# Patient Record
Sex: FEMALE | Race: WHITE | Employment: UNEMPLOYED | URBAN - METROPOLITAN AREA
[De-identification: names, ages, dates, MRNs, and addresses within clinical notes are randomized per-mention and may not be internally consistent; named-entity substitution may affect disease eponyms.]

---

## 2021-10-08 ENCOUNTER — HOSPITAL ENCOUNTER (OUTPATIENT)
Age: 26
Discharge: HOME | End: 2021-10-09
Attending: OBSTETRICS & GYNECOLOGY | Admitting: OBSTETRICS & GYNECOLOGY
Payer: MEDICAID

## 2021-10-08 ENCOUNTER — APPOINTMENT (OUTPATIENT)
Dept: ULTRASOUND IMAGING | Age: 26
End: 2021-10-08
Payer: MEDICAID

## 2021-10-08 VITALS
HEIGHT: 68 IN | DIASTOLIC BLOOD PRESSURE: 84 MMHG | TEMPERATURE: 98 F | WEIGHT: 204 LBS | HEART RATE: 94 BPM | SYSTOLIC BLOOD PRESSURE: 136 MMHG | RESPIRATION RATE: 20 BRPM | BODY MASS INDEX: 30.92 KG/M2

## 2021-10-08 PROBLEM — O47.9 THREATENED LABOR: Status: ACTIVE | Noted: 2021-10-08

## 2021-10-08 PROCEDURE — U0003 INFECTIOUS AGENT DETECTION BY NUCLEIC ACID (DNA OR RNA); SEVERE ACUTE RESPIRATORY SYNDROME CORONAVIRUS 2 (SARS-COV-2) (CORONAVIRUS DISEASE [COVID-19]), AMPLIFIED PROBE TECHNIQUE, MAKING USE OF HIGH THROUGHPUT TECHNOLOGIES AS DESCRIBED BY CMS-2020-01-R: HCPCS

## 2021-10-08 PROCEDURE — 87635 SARS-COV-2 COVID-19 AMP PRB: CPT

## 2021-10-08 PROCEDURE — 80307 DRUG TEST PRSMV CHEM ANLYZR: CPT

## 2021-10-08 PROCEDURE — 87081 CULTURE SCREEN ONLY: CPT

## 2021-10-08 PROCEDURE — 86850 RBC ANTIBODY SCREEN: CPT

## 2021-10-08 PROCEDURE — 86900 BLOOD TYPING SEROLOGIC ABO: CPT

## 2021-10-08 PROCEDURE — 87340 HEPATITIS B SURFACE AG IA: CPT

## 2021-10-08 PROCEDURE — 80048 BASIC METABOLIC PNL TOTAL CA: CPT

## 2021-10-08 PROCEDURE — 87077 CULTURE AEROBIC IDENTIFY: CPT

## 2021-10-08 PROCEDURE — 86701 HIV-1ANTIBODY: CPT

## 2021-10-08 PROCEDURE — 84443 ASSAY THYROID STIM HORMONE: CPT

## 2021-10-08 PROCEDURE — 81003 URINALYSIS AUTO W/O SCOPE: CPT

## 2021-10-08 PROCEDURE — 86803 HEPATITIS C AB TEST: CPT

## 2021-10-08 PROCEDURE — 86762 RUBELLA ANTIBODY: CPT

## 2021-10-08 PROCEDURE — 86780 TREPONEMA PALLIDUM: CPT

## 2021-10-08 PROCEDURE — 76815 OB US LIMITED FETUS(S): CPT

## 2021-10-08 PROCEDURE — 85025 COMPLETE CBC W/AUTO DIFF WBC: CPT

## 2021-10-08 PROCEDURE — 86901 BLOOD TYPING SEROLOGIC RH(D): CPT

## 2021-10-08 PROCEDURE — 83036 HEMOGLOBIN GLYCOSYLATED A1C: CPT

## 2021-10-08 PROCEDURE — U0005 INFEC AGEN DETEC AMPLI PROBE: HCPCS

## 2021-10-08 ASSESSMENT — PAIN DESCRIPTION - DESCRIPTORS: DESCRIPTORS: CRAMPING

## 2021-10-09 LAB
ABO/RH: NORMAL
AMPHETAMINE SCREEN, URINE: ABNORMAL
ANION GAP SERPL CALCULATED.3IONS-SCNC: 12 MMOL/L (ref 3–16)
ANTIBODY SCREEN: NORMAL
BACTERIA: ABNORMAL /HPF
BARBITURATE SCREEN URINE: ABNORMAL
BASOPHILS ABSOLUTE: 0 K/UL (ref 0–0.2)
BASOPHILS RELATIVE PERCENT: 0.2 %
BENZODIAZEPINE SCREEN, URINE: ABNORMAL
BILIRUBIN URINE: NEGATIVE
BLOOD, URINE: NEGATIVE
BUN BLDV-MCNC: 4 MG/DL (ref 7–20)
CALCIUM SERPL-MCNC: 8.5 MG/DL (ref 8.3–10.6)
CANNABINOID SCREEN URINE: POSITIVE
CHLORIDE BLD-SCNC: 102 MMOL/L (ref 99–110)
CLARITY: ABNORMAL
CO2: 22 MMOL/L (ref 21–32)
COCAINE METABOLITE SCREEN URINE: ABNORMAL
COLOR: YELLOW
CREAT SERPL-MCNC: <0.5 MG/DL (ref 0.6–1.1)
EOSINOPHILS ABSOLUTE: 0 K/UL (ref 0–0.6)
EOSINOPHILS RELATIVE PERCENT: 0.5 %
EPITHELIAL CELLS, UA: 17 /HPF (ref 0–5)
ESTIMATED AVERAGE GLUCOSE: 82.5 MG/DL
GFR AFRICAN AMERICAN: >60
GFR NON-AFRICAN AMERICAN: >60
GLUCOSE BLD-MCNC: 89 MG/DL (ref 70–99)
GLUCOSE URINE: NEGATIVE MG/DL
HBA1C MFR BLD: 4.5 %
HCT VFR BLD CALC: 31.7 % (ref 36–48)
HEMOGLOBIN: 10.6 G/DL (ref 12–16)
HEPATITIS B SURFACE ANTIGEN INTERPRETATION: NORMAL
HEPATITIS C ANTIBODY INTERPRETATION: NORMAL
HYALINE CASTS: 4 /LPF (ref 0–8)
KETONES, URINE: NEGATIVE MG/DL
LEUKOCYTE ESTERASE, URINE: ABNORMAL
LYMPHOCYTES ABSOLUTE: 2.5 K/UL (ref 1–5.1)
LYMPHOCYTES RELATIVE PERCENT: 26.4 %
Lab: ABNORMAL
MCH RBC QN AUTO: 28.2 PG (ref 26–34)
MCHC RBC AUTO-ENTMCNC: 33.4 G/DL (ref 31–36)
MCV RBC AUTO: 84.4 FL (ref 80–100)
METHADONE SCREEN, URINE: ABNORMAL
MICROSCOPIC EXAMINATION: YES
MONOCYTES ABSOLUTE: 0.7 K/UL (ref 0–1.3)
MONOCYTES RELATIVE PERCENT: 7.7 %
NEUTROPHILS ABSOLUTE: 6.3 K/UL (ref 1.7–7.7)
NEUTROPHILS RELATIVE PERCENT: 65.2 %
NITRITE, URINE: NEGATIVE
OPIATE SCREEN URINE: ABNORMAL
OXYCODONE URINE: ABNORMAL
PDW BLD-RTO: 15.7 % (ref 12.4–15.4)
PH UA: 7.5
PH UA: 7.5 (ref 5–8)
PHENCYCLIDINE SCREEN URINE: ABNORMAL
PLATELET # BLD: 170 K/UL (ref 135–450)
PMV BLD AUTO: 10.8 FL (ref 5–10.5)
POTASSIUM SERPL-SCNC: 3.7 MMOL/L (ref 3.5–5.1)
PROPOXYPHENE SCREEN: ABNORMAL
PROTEIN UA: NEGATIVE MG/DL
RAPID HIV 1&2: NORMAL
RBC # BLD: 3.76 M/UL (ref 4–5.2)
RBC UA: 6 /HPF (ref 0–4)
RUBELLA ANTIBODY IGG: 17.1 IU/ML
SARS-COV-2, NAAT: NOT DETECTED
SARS-COV-2: NOT DETECTED
SODIUM BLD-SCNC: 136 MMOL/L (ref 136–145)
SPECIFIC GRAVITY UA: 1.01 (ref 1–1.03)
TOTAL SYPHILLIS IGG/IGM: NORMAL
TSH REFLEX: 1.15 UIU/ML (ref 0.27–4.2)
URINE TYPE: ABNORMAL
UROBILINOGEN, URINE: 1 E.U./DL
WBC # BLD: 9.6 K/UL (ref 4–11)
WBC UA: 136 /HPF (ref 0–5)
YEAST: PRESENT /HPF

## 2021-10-09 PROCEDURE — 99211 OFF/OP EST MAY X REQ PHY/QHP: CPT

## 2021-10-09 PROCEDURE — 59025 FETAL NON-STRESS TEST: CPT

## 2021-10-09 NOTE — FLOWSHEET NOTE
Unable to obtain records. Hospital in [place] says they have record that she has delivered prior and has had 1 visit this pregnancy. Unable to obtain any lab work or ultrasound. Dr. Xuan Unger made aware. Order received to draw all prenatal labs and to obtain ultrasound for dating if ultrasound is still in house. Updated patient on plan of care. Agreeable.

## 2021-10-09 NOTE — FLOWSHEET NOTE
Patient discharged home ambulatory and undelivered. Discharge instructions given. Instructed patient to call the office on Monday morning to schedule a follow up so she can get her  scheduled. Patient verbalized understanding. Declines any further needs at this time.

## 2021-10-09 NOTE — H&P
Department of Obstetrics and Gynecology  Labor and Delivery Triage Note        CHIEF COMPLAINT:  contractions    HISTORY OF PRESENT ILLNESS:      The patient is a 23 y.o. female Unknown. OB History        6    Para   4    Term   4            AB   1    Living   4       SAB   1    TAB        Ectopic        Molar        Multiple        Live Births   4              Patient presents with a chief complaint as above. Pt presents requesting CSection. States she was scheduled to have CSec in [place] 2 days ago but moved back to [place]. She states she had a visit at 3 months at a hospital there and then at 7 months. When hospital called they had no record of the patient. Pt states this will be her 5th CSection. She denies problems with previous pregnancies. Estimated Due Date:  Estimated Date of Delivery: None noted. PAST MEDICAL HISTORY: History reviewed. No pertinent past medical history. PAST  SURGICAL HISTORY:   Past Surgical History:   Procedure Laterality Date     SECTION, LOW TRANSVERSE      x4       SOCIAL HISTORY:     reports that she quit smoking about 6 months ago. Her smoking use included cigarettes. She smoked 0.25 packs per day. She has never used smokeless tobacco. She reports previous alcohol use. She reports current drug use. Drug: Marijuana. MEDICATIONS:    Prior to Admission medications    Medication Sig Start Date End Date Taking? Authorizing Provider   Prenatal Vit-Fe Fumarate-FA (PRENATAL VITAMIN PO) Take 1 tablet by mouth   Yes Historical Provider, MD        PRENATAL CARE:    Complicated by: No prenatal care, Previous CSection x 4    REVIEW OF SYSTEMS:     Pertinent items are noted in HPI. PHYSICAL EXAM:    Vital Signs: Blood pressure 136/84, pulse 94, temperature 98 °F (36.7 °C), resp. rate 20, height 5' 8\" (1.727 m), weight 204 lb (92.5 kg). Abdomen soft, non tender, consistent with her EGA.     Fetal heart rate:  Baseline Heart Rate 130's-140's, accelerations:  present  long term variability:  moderate  Cervix:  Closed Long medium    Contraction frequency:  irregular    Membranes:  Intact    General Labs:      PN labs drawn and pending  Formal Ultrasound for dates done, pending    ASSESSMENT:    Unknown. Patient Active Problem List   Diagnosis    Threatened labor    No Prenatal Care  Previous  Section      PLAN:  Disposition:  Patient discharged home and instructed on symptoms of labor, rupture of membranes, vaginal bleeding, fetal movement and fever  F/U Instructions: in 2 days, or sooner should symptoms worsen. Call office/clinic for appointment.      Ina Wells MD  10/9/2021

## 2021-10-09 NOTE — FLOWSHEET NOTE
Patient presents to triage stating she is in labor and was supposed to have a  two days ago in [place]. She states that she has had 4 prior c-sections. Patient states that was in the process of moving here and didn't want to deliver in [place] because all of her belongings were here. Patient states that she had an ultrasound at 3 months but then no care until 7 months because of Covid. She didn't want to risk exposure. Patient states that she was Covid positive at the delivery of her previous baby. Patient admitted to triage B.

## 2021-10-09 NOTE — FLOWSHEET NOTE
Dr. Lydia Bains at bedside. SVE as documented. Patient stated she was supposed to deliver in [place]. Name of hospital provided. Will attempt to get any records related to pregnancy.

## 2021-10-09 NOTE — PROCEDURES
FETAL SURVEILLANCE TESTING SUMMARY    INDICATIONS:  rule out uterine contractions    OBJECTIVE RESULTS:  Fetal heart variability: moderate  Fetal Heart Rate accelerations: yes  Baseline FHR: 130's per minute  Fetal Non-stress Test: reactive  Uterine contractions: irregular    Fetal surveillance: reassuring

## 2021-10-11 LAB
GROUP B STREP CULTURE: ABNORMAL
ORGANISM: ABNORMAL

## 2021-10-12 ENCOUNTER — ANESTHESIA EVENT (OUTPATIENT)
Dept: LABOR AND DELIVERY | Age: 26
DRG: 540 | End: 2021-10-12
Payer: MEDICAID

## 2021-10-12 ENCOUNTER — INITIAL PRENATAL (OUTPATIENT)
Dept: OBGYN | Age: 26
DRG: 540 | End: 2021-10-12
Payer: MEDICAID

## 2021-10-12 ENCOUNTER — HOSPITAL ENCOUNTER (INPATIENT)
Age: 26
LOS: 3 days | Discharge: HOME | DRG: 540 | End: 2021-10-15
Attending: OBSTETRICS & GYNECOLOGY | Admitting: OBSTETRICS & GYNECOLOGY
Payer: MEDICAID

## 2021-10-12 ENCOUNTER — ANESTHESIA (OUTPATIENT)
Dept: LABOR AND DELIVERY | Age: 26
DRG: 540 | End: 2021-10-12
Payer: MEDICAID

## 2021-10-12 VITALS
HEART RATE: 96 BPM | DIASTOLIC BLOOD PRESSURE: 81 MMHG | SYSTOLIC BLOOD PRESSURE: 134 MMHG | WEIGHT: 208 LBS | BODY MASS INDEX: 31.63 KG/M2

## 2021-10-12 VITALS
OXYGEN SATURATION: 100 % | RESPIRATION RATE: 1 BRPM | DIASTOLIC BLOOD PRESSURE: 70 MMHG | SYSTOLIC BLOOD PRESSURE: 117 MMHG

## 2021-10-12 DIAGNOSIS — Z34.93 PRENATAL CARE IN THIRD TRIMESTER: Primary | ICD-10-CM

## 2021-10-12 LAB
A/G RATIO: 0.9 (ref 1.1–2.2)
ABO/RH: NORMAL
ALBUMIN SERPL-MCNC: 3.7 G/DL (ref 3.4–5)
ALP BLD-CCNC: 121 U/L (ref 40–129)
ALT SERPL-CCNC: 11 U/L (ref 10–40)
AMPHETAMINE SCREEN, URINE: ABNORMAL
ANION GAP SERPL CALCULATED.3IONS-SCNC: 14 MMOL/L (ref 3–16)
ANTIBODY SCREEN: NORMAL
AST SERPL-CCNC: 14 U/L (ref 15–37)
BARBITURATE SCREEN URINE: ABNORMAL
BASOPHILS ABSOLUTE: 0 K/UL (ref 0–0.2)
BASOPHILS RELATIVE PERCENT: 0.2 %
BENZODIAZEPINE SCREEN, URINE: ABNORMAL
BILIRUB SERPL-MCNC: <0.2 MG/DL (ref 0–1)
BILIRUBIN URINE: NEGATIVE
BILIRUBIN URINE: NEGATIVE MG/DL
BLOOD, URINE: ABNORMAL
BLOOD, URINE: NEGATIVE
BUN BLDV-MCNC: 4 MG/DL (ref 7–20)
BUPRENORPHINE URINE: ABNORMAL
CALCIUM SERPL-MCNC: 9.4 MG/DL (ref 8.3–10.6)
CANNABINOID SCREEN URINE: POSITIVE
CHLORIDE BLD-SCNC: 101 MMOL/L (ref 99–110)
CLARITY: CLEAR
CLARITY: CLEAR
CO2: 20 MMOL/L (ref 21–32)
COCAINE METABOLITE SCREEN URINE: ABNORMAL
COLOR: YELLOW
COLOR: YELLOW
CREAT SERPL-MCNC: <0.5 MG/DL (ref 0.6–1.1)
EOSINOPHILS ABSOLUTE: 0.1 K/UL (ref 0–0.6)
EOSINOPHILS RELATIVE PERCENT: 0.7 %
EPITHELIAL CELLS, UA: 5 /HPF (ref 0–5)
GFR AFRICAN AMERICAN: >60
GFR NON-AFRICAN AMERICAN: >60
GLOBULIN: 4.1 G/DL
GLUCOSE BLD-MCNC: 82 MG/DL (ref 70–99)
GLUCOSE URINE: NEGATIVE MG/DL
GLUCOSE URINE: NEGATIVE MG/DL
HCT VFR BLD CALC: 33.2 % (ref 36–48)
HEMOGLOBIN: 11.1 G/DL (ref 12–16)
HYALINE CASTS: 2 /LPF (ref 0–8)
KETONES, URINE: NEGATIVE MG/DL
KETONES, URINE: NEGATIVE MG/DL
LEUKOCYTE ESTERASE, URINE: ABNORMAL
LEUKOCYTE ESTERASE, URINE: ABNORMAL
LYMPHOCYTES ABSOLUTE: 2.5 K/UL (ref 1–5.1)
LYMPHOCYTES RELATIVE PERCENT: 22.8 %
Lab: ABNORMAL
MCH RBC QN AUTO: 28.7 PG (ref 26–34)
MCHC RBC AUTO-ENTMCNC: 33.6 G/DL (ref 31–36)
MCV RBC AUTO: 85.4 FL (ref 80–100)
METHADONE SCREEN, URINE: ABNORMAL
MICROSCOPIC EXAMINATION: YES
MONOCYTES ABSOLUTE: 0.9 K/UL (ref 0–1.3)
MONOCYTES RELATIVE PERCENT: 8.6 %
NEUTROPHILS ABSOLUTE: 7.3 K/UL (ref 1.7–7.7)
NEUTROPHILS RELATIVE PERCENT: 67.7 %
NITRITE, URINE: NEGATIVE
NITRITE, URINE: NEGATIVE
OPIATE SCREEN URINE: ABNORMAL
OXYCODONE URINE: ABNORMAL
PDW BLD-RTO: 15.6 % (ref 12.4–15.4)
PH UA: 6.5
PH UA: 7 (ref 5–8)
PH UA: 7.5 (ref 5–8)
PHENCYCLIDINE SCREEN URINE: ABNORMAL
PLATELET # BLD: 153 K/UL (ref 135–450)
PMV BLD AUTO: 10.8 FL (ref 5–10.5)
POTASSIUM SERPL-SCNC: 3.9 MMOL/L (ref 3.5–5.1)
PROPOXYPHENE SCREEN: ABNORMAL
PROTEIN UA: 30 MG/DL
PROTEIN UA: NEGATIVE MG/DL
RBC # BLD: 3.89 M/UL (ref 4–5.2)
RBC UA: 2 /HPF (ref 0–4)
SARS-COV-2, NAAT: NOT DETECTED
SODIUM BLD-SCNC: 135 MMOL/L (ref 136–145)
SPECIFIC GRAVITY UA: 1.01 (ref 1–1.03)
SPECIFIC GRAVITY UA: 1.02 (ref 1–1.03)
TOTAL PROTEIN: 7.8 G/DL (ref 6.4–8.2)
URIC ACID, SERUM: 4.1 MG/DL (ref 2.6–6)
URINE TYPE: ABNORMAL
UROBILINOGEN, URINE: 0.2 E.U./DL
UROBILINOGEN, URINE: 0.2 E.U./DL
WBC # BLD: 10.8 K/UL (ref 4–11)
WBC UA: 12 /HPF (ref 0–5)

## 2021-10-12 PROCEDURE — 81001 URINALYSIS AUTO W/SCOPE: CPT

## 2021-10-12 PROCEDURE — 2709999900 HC NON-CHARGEABLE SUPPLY: Performed by: OBSTETRICS & GYNECOLOGY

## 2021-10-12 PROCEDURE — 99204 OFFICE O/P NEW MOD 45 MIN: CPT | Performed by: OBSTETRICS & GYNECOLOGY

## 2021-10-12 PROCEDURE — 80307 DRUG TEST PRSMV CHEM ANLYZR: CPT

## 2021-10-12 PROCEDURE — 2500000003 HC RX 250 WO HCPCS: Performed by: NURSE ANESTHETIST, CERTIFIED REGISTERED

## 2021-10-12 PROCEDURE — 85025 COMPLETE CBC W/AUTO DIFF WBC: CPT

## 2021-10-12 PROCEDURE — 81003 URINALYSIS AUTO W/O SCOPE: CPT

## 2021-10-12 PROCEDURE — 3609079900 HC CESAREAN SECTION: Performed by: OBSTETRICS & GYNECOLOGY

## 2021-10-12 PROCEDURE — 86780 TREPONEMA PALLIDUM: CPT

## 2021-10-12 PROCEDURE — 84550 ASSAY OF BLOOD/URIC ACID: CPT

## 2021-10-12 PROCEDURE — 2580000003 HC RX 258: Performed by: OBSTETRICS & GYNECOLOGY

## 2021-10-12 PROCEDURE — 2500000003 HC RX 250 WO HCPCS: Performed by: OBSTETRICS & GYNECOLOGY

## 2021-10-12 PROCEDURE — 6360000002 HC RX W HCPCS: Performed by: NURSE ANESTHETIST, CERTIFIED REGISTERED

## 2021-10-12 PROCEDURE — 3700000001 HC ADD 15 MINUTES (ANESTHESIA): Performed by: OBSTETRICS & GYNECOLOGY

## 2021-10-12 PROCEDURE — 86850 RBC ANTIBODY SCREEN: CPT

## 2021-10-12 PROCEDURE — 6360000002 HC RX W HCPCS: Performed by: OBSTETRICS & GYNECOLOGY

## 2021-10-12 PROCEDURE — 86901 BLOOD TYPING SEROLOGIC RH(D): CPT

## 2021-10-12 PROCEDURE — 86923 COMPATIBILITY TEST ELECTRIC: CPT

## 2021-10-12 PROCEDURE — 1220000000 HC SEMI PRIVATE OB R&B

## 2021-10-12 PROCEDURE — 7100000001 HC PACU RECOVERY - ADDTL 15 MIN: Performed by: OBSTETRICS & GYNECOLOGY

## 2021-10-12 PROCEDURE — 87635 SARS-COV-2 COVID-19 AMP PRB: CPT

## 2021-10-12 PROCEDURE — 7100000000 HC PACU RECOVERY - FIRST 15 MIN: Performed by: OBSTETRICS & GYNECOLOGY

## 2021-10-12 PROCEDURE — 86900 BLOOD TYPING SEROLOGIC ABO: CPT

## 2021-10-12 PROCEDURE — 6370000000 HC RX 637 (ALT 250 FOR IP): Performed by: OBSTETRICS & GYNECOLOGY

## 2021-10-12 PROCEDURE — 80053 COMPREHEN METABOLIC PANEL: CPT

## 2021-10-12 PROCEDURE — 3700000000 HC ANESTHESIA ATTENDED CARE: Performed by: OBSTETRICS & GYNECOLOGY

## 2021-10-12 RX ORDER — METOCLOPRAMIDE HYDROCHLORIDE 5 MG/ML
10 INJECTION INTRAMUSCULAR; INTRAVENOUS ONCE
Status: COMPLETED | OUTPATIENT
Start: 2021-10-12 | End: 2021-10-12

## 2021-10-12 RX ORDER — IBUPROFEN 800 MG/1
800 TABLET ORAL EVERY 6 HOURS PRN
Qty: 60 TABLET | Refills: 0 | Status: SHIPPED | OUTPATIENT
Start: 2021-10-12

## 2021-10-12 RX ORDER — MODIFIED LANOLIN
OINTMENT (GRAM) TOPICAL
Status: DISCONTINUED | OUTPATIENT
Start: 2021-10-12 | End: 2021-10-15 | Stop reason: HOSPADM

## 2021-10-12 RX ORDER — FAMOTIDINE 20 MG/1
20 TABLET, FILM COATED ORAL 2 TIMES DAILY PRN
Status: DISCONTINUED | OUTPATIENT
Start: 2021-10-12 | End: 2021-10-15 | Stop reason: HOSPADM

## 2021-10-12 RX ORDER — OXYCODONE HYDROCHLORIDE 5 MG/1
10 TABLET ORAL EVERY 4 HOURS PRN
Status: DISCONTINUED | OUTPATIENT
Start: 2021-10-12 | End: 2021-10-15 | Stop reason: HOSPADM

## 2021-10-12 RX ORDER — MISOPROSTOL 100 UG/1
800 TABLET ORAL PRN
Status: DISCONTINUED | OUTPATIENT
Start: 2021-10-12 | End: 2021-10-15 | Stop reason: HOSPADM

## 2021-10-12 RX ORDER — TRISODIUM CITRATE DIHYDRATE AND CITRIC ACID MONOHYDRATE 500; 334 MG/5ML; MG/5ML
30 SOLUTION ORAL ONCE
Status: DISCONTINUED | OUTPATIENT
Start: 2021-10-12 | End: 2021-10-12

## 2021-10-12 RX ORDER — SODIUM CHLORIDE, SODIUM LACTATE, POTASSIUM CHLORIDE, CALCIUM CHLORIDE 600; 310; 30; 20 MG/100ML; MG/100ML; MG/100ML; MG/100ML
1000 INJECTION, SOLUTION INTRAVENOUS ONCE
Status: COMPLETED | OUTPATIENT
Start: 2021-10-12 | End: 2021-10-12

## 2021-10-12 RX ORDER — SODIUM CHLORIDE 0.9 % (FLUSH) 0.9 %
10 SYRINGE (ML) INJECTION PRN
Status: DISCONTINUED | OUTPATIENT
Start: 2021-10-12 | End: 2021-10-12

## 2021-10-12 RX ORDER — KETOROLAC TROMETHAMINE 30 MG/ML
30 INJECTION, SOLUTION INTRAMUSCULAR; INTRAVENOUS EVERY 8 HOURS
Status: DISCONTINUED | OUTPATIENT
Start: 2021-10-13 | End: 2021-10-15 | Stop reason: HOSPADM

## 2021-10-12 RX ORDER — ACETAMINOPHEN 325 MG/1
975 TABLET ORAL ONCE
Status: DISCONTINUED | OUTPATIENT
Start: 2021-10-12 | End: 2021-10-12

## 2021-10-12 RX ORDER — BISACODYL 10 MG
10 SUPPOSITORY, RECTAL RECTAL DAILY PRN
Status: DISCONTINUED | OUTPATIENT
Start: 2021-10-12 | End: 2021-10-15 | Stop reason: HOSPADM

## 2021-10-12 RX ORDER — SODIUM CHLORIDE, SODIUM LACTATE, POTASSIUM CHLORIDE, CALCIUM CHLORIDE 600; 310; 30; 20 MG/100ML; MG/100ML; MG/100ML; MG/100ML
INJECTION, SOLUTION INTRAVENOUS CONTINUOUS
Status: DISCONTINUED | OUTPATIENT
Start: 2021-10-13 | End: 2021-10-15 | Stop reason: HOSPADM

## 2021-10-12 RX ORDER — SODIUM CHLORIDE 0.9 % (FLUSH) 0.9 %
10 SYRINGE (ML) INJECTION PRN
Status: DISCONTINUED | OUTPATIENT
Start: 2021-10-12 | End: 2021-10-15 | Stop reason: HOSPADM

## 2021-10-12 RX ORDER — MISOPROSTOL 100 UG/1
TABLET ORAL
Status: DISCONTINUED
Start: 2021-10-12 | End: 2021-10-12 | Stop reason: WASHOUT

## 2021-10-12 RX ORDER — SODIUM CHLORIDE 0.9 % (FLUSH) 0.9 %
10 SYRINGE (ML) INJECTION EVERY 12 HOURS SCHEDULED
Status: DISCONTINUED | OUTPATIENT
Start: 2021-10-13 | End: 2021-10-15 | Stop reason: HOSPADM

## 2021-10-12 RX ORDER — SODIUM CHLORIDE, SODIUM LACTATE, POTASSIUM CHLORIDE, CALCIUM CHLORIDE 600; 310; 30; 20 MG/100ML; MG/100ML; MG/100ML; MG/100ML
INJECTION, SOLUTION INTRAVENOUS CONTINUOUS
Status: DISCONTINUED | OUTPATIENT
Start: 2021-10-12 | End: 2021-10-12

## 2021-10-12 RX ORDER — DOCUSATE SODIUM 100 MG/1
100 CAPSULE, LIQUID FILLED ORAL 2 TIMES DAILY
Status: DISCONTINUED | OUTPATIENT
Start: 2021-10-13 | End: 2021-10-15 | Stop reason: HOSPADM

## 2021-10-12 RX ORDER — METOCLOPRAMIDE HYDROCHLORIDE 5 MG/ML
10 INJECTION INTRAMUSCULAR; INTRAVENOUS ONCE
Status: DISCONTINUED | OUTPATIENT
Start: 2021-10-12 | End: 2021-10-12

## 2021-10-12 RX ORDER — OXYCODONE HYDROCHLORIDE 5 MG/1
5 TABLET ORAL EVERY 6 HOURS PRN
Qty: 28 TABLET | Refills: 0 | Status: SHIPPED | OUTPATIENT
Start: 2021-10-12 | End: 2021-10-19

## 2021-10-12 RX ORDER — ONDANSETRON 8 MG/1
8 TABLET, ORALLY DISINTEGRATING ORAL EVERY 8 HOURS PRN
Status: DISCONTINUED | OUTPATIENT
Start: 2021-10-12 | End: 2021-10-15 | Stop reason: HOSPADM

## 2021-10-12 RX ORDER — DEXAMETHASONE SODIUM PHOSPHATE 4 MG/ML
INJECTION, SOLUTION INTRA-ARTICULAR; INTRALESIONAL; INTRAMUSCULAR; INTRAVENOUS; SOFT TISSUE PRN
Status: DISCONTINUED | OUTPATIENT
Start: 2021-10-12 | End: 2021-10-12 | Stop reason: SDUPTHER

## 2021-10-12 RX ORDER — CARBOPROST TROMETHAMINE 250 UG/ML
INJECTION, SOLUTION INTRAMUSCULAR
Status: DISCONTINUED
Start: 2021-10-12 | End: 2021-10-12 | Stop reason: WASHOUT

## 2021-10-12 RX ORDER — KETOROLAC TROMETHAMINE 30 MG/ML
INJECTION, SOLUTION INTRAMUSCULAR; INTRAVENOUS PRN
Status: DISCONTINUED | OUTPATIENT
Start: 2021-10-12 | End: 2021-10-12 | Stop reason: SDUPTHER

## 2021-10-12 RX ORDER — SODIUM CHLORIDE 9 MG/ML
25 INJECTION, SOLUTION INTRAVENOUS PRN
Status: DISCONTINUED | OUTPATIENT
Start: 2021-10-12 | End: 2021-10-15 | Stop reason: HOSPADM

## 2021-10-12 RX ORDER — IBUPROFEN 800 MG/1
800 TABLET ORAL EVERY 8 HOURS
Status: DISCONTINUED | OUTPATIENT
Start: 2021-10-13 | End: 2021-10-15 | Stop reason: HOSPADM

## 2021-10-12 RX ORDER — ONDANSETRON 2 MG/ML
INJECTION INTRAMUSCULAR; INTRAVENOUS PRN
Status: DISCONTINUED | OUTPATIENT
Start: 2021-10-12 | End: 2021-10-12 | Stop reason: SDUPTHER

## 2021-10-12 RX ORDER — ACETAMINOPHEN 500 MG
1000 TABLET ORAL ONCE
Status: COMPLETED | OUTPATIENT
Start: 2021-10-12 | End: 2021-10-12

## 2021-10-12 RX ORDER — SODIUM CHLORIDE 9 MG/ML
25 INJECTION, SOLUTION INTRAVENOUS PRN
Status: DISCONTINUED | OUTPATIENT
Start: 2021-10-12 | End: 2021-10-12

## 2021-10-12 RX ORDER — CARBOPROST TROMETHAMINE 250 UG/ML
250 INJECTION, SOLUTION INTRAMUSCULAR PRN
Status: DISCONTINUED | OUTPATIENT
Start: 2021-10-12 | End: 2021-10-15 | Stop reason: HOSPADM

## 2021-10-12 RX ORDER — MORPHINE SULFATE 1 MG/ML
INJECTION, SOLUTION EPIDURAL; INTRATHECAL; INTRAVENOUS PRN
Status: DISCONTINUED | OUTPATIENT
Start: 2021-10-12 | End: 2021-10-12 | Stop reason: SDUPTHER

## 2021-10-12 RX ORDER — SENNA AND DOCUSATE SODIUM 50; 8.6 MG/1; MG/1
1 TABLET, FILM COATED ORAL DAILY PRN
Status: DISCONTINUED | OUTPATIENT
Start: 2021-10-12 | End: 2021-10-15 | Stop reason: HOSPADM

## 2021-10-12 RX ORDER — SODIUM CHLORIDE 0.9 % (FLUSH) 0.9 %
10 SYRINGE (ML) INJECTION EVERY 12 HOURS SCHEDULED
Status: DISCONTINUED | OUTPATIENT
Start: 2021-10-12 | End: 2021-10-12

## 2021-10-12 RX ORDER — ONDANSETRON 2 MG/ML
4 INJECTION INTRAMUSCULAR; INTRAVENOUS EVERY 6 HOURS PRN
Status: DISCONTINUED | OUTPATIENT
Start: 2021-10-12 | End: 2021-10-15 | Stop reason: HOSPADM

## 2021-10-12 RX ORDER — OXYCODONE HYDROCHLORIDE 5 MG/1
5 TABLET ORAL EVERY 4 HOURS PRN
Status: DISCONTINUED | OUTPATIENT
Start: 2021-10-12 | End: 2021-10-15 | Stop reason: HOSPADM

## 2021-10-12 RX ORDER — SIMETHICONE 80 MG
80 TABLET,CHEWABLE ORAL EVERY 6 HOURS PRN
Status: DISCONTINUED | OUTPATIENT
Start: 2021-10-12 | End: 2021-10-15 | Stop reason: HOSPADM

## 2021-10-12 RX ORDER — FERROUS SULFATE 325(65) MG
325 TABLET ORAL 2 TIMES DAILY WITH MEALS
Status: DISCONTINUED | OUTPATIENT
Start: 2021-10-13 | End: 2021-10-15 | Stop reason: HOSPADM

## 2021-10-12 RX ORDER — ACETAMINOPHEN 500 MG
1000 TABLET ORAL EVERY 8 HOURS SCHEDULED
Status: DISCONTINUED | OUTPATIENT
Start: 2021-10-13 | End: 2021-10-15 | Stop reason: HOSPADM

## 2021-10-12 RX ORDER — DIPHENHYDRAMINE HYDROCHLORIDE 50 MG/ML
25 INJECTION INTRAMUSCULAR; INTRAVENOUS EVERY 6 HOURS PRN
Status: DISCONTINUED | OUTPATIENT
Start: 2021-10-12 | End: 2021-10-15 | Stop reason: HOSPADM

## 2021-10-12 RX ORDER — POLYETHYLENE GLYCOL 3350 17 G/17G
17 POWDER, FOR SOLUTION ORAL DAILY
Status: DISCONTINUED | OUTPATIENT
Start: 2021-10-13 | End: 2021-10-15 | Stop reason: HOSPADM

## 2021-10-12 RX ORDER — FENTANYL CITRATE 50 UG/ML
INJECTION, SOLUTION INTRAMUSCULAR; INTRAVENOUS PRN
Status: DISCONTINUED | OUTPATIENT
Start: 2021-10-12 | End: 2021-10-12 | Stop reason: SDUPTHER

## 2021-10-12 RX ORDER — BUPIVACAINE HYDROCHLORIDE 7.5 MG/ML
INJECTION, SOLUTION INTRASPINAL PRN
Status: DISCONTINUED | OUTPATIENT
Start: 2021-10-12 | End: 2021-10-12 | Stop reason: SDUPTHER

## 2021-10-12 RX ADMIN — ONDANSETRON 4 MG: 2 INJECTION INTRAMUSCULAR; INTRAVENOUS at 19:54

## 2021-10-12 RX ADMIN — Medication: at 20:55

## 2021-10-12 RX ADMIN — ACETAMINOPHEN 1000 MG: 500 TABLET ORAL at 19:47

## 2021-10-12 RX ADMIN — FENTANYL CITRATE 15 MCG: 50 INJECTION, SOLUTION INTRAMUSCULAR; INTRAVENOUS at 20:00

## 2021-10-12 RX ADMIN — DEXAMETHASONE SODIUM PHOSPHATE 8 MG: 4 INJECTION, SOLUTION INTRAMUSCULAR; INTRAVENOUS at 20:32

## 2021-10-12 RX ADMIN — BUPIVACAINE HYDROCHLORIDE 1.8 ML: 7.5 INJECTION, SOLUTION INTRASPINAL at 20:00

## 2021-10-12 RX ADMIN — Medication 999 ML/HR: at 20:30

## 2021-10-12 RX ADMIN — METOCLOPRAMIDE 10 MG: 5 INJECTION, SOLUTION INTRAMUSCULAR; INTRAVENOUS at 19:46

## 2021-10-12 RX ADMIN — SODIUM CHLORIDE, POTASSIUM CHLORIDE, SODIUM LACTATE AND CALCIUM CHLORIDE: 600; 310; 30; 20 INJECTION, SOLUTION INTRAVENOUS at 21:12

## 2021-10-12 RX ADMIN — KETOROLAC TROMETHAMINE 30 MG: 30 INJECTION, SOLUTION INTRAMUSCULAR; INTRAVENOUS at 20:49

## 2021-10-12 RX ADMIN — SODIUM CHLORIDE, POTASSIUM CHLORIDE, SODIUM LACTATE AND CALCIUM CHLORIDE 999 ML: 600; 310; 30; 20 INJECTION, SOLUTION INTRAVENOUS at 18:49

## 2021-10-12 RX ADMIN — MORPHINE SULFATE 0.2 MG: 1 INJECTION EPIDURAL; INTRATHECAL; INTRAVENOUS at 20:00

## 2021-10-12 RX ADMIN — Medication 909.1 MILLI-UNITS/MIN: at 21:13

## 2021-10-12 RX ADMIN — FAMOTIDINE 20 MG: 10 INJECTION, SOLUTION INTRAVENOUS at 19:47

## 2021-10-12 RX ADMIN — SODIUM CHLORIDE, POTASSIUM CHLORIDE, SODIUM LACTATE AND CALCIUM CHLORIDE: 600; 310; 30; 20 INJECTION, SOLUTION INTRAVENOUS at 18:44

## 2021-10-12 RX ADMIN — CEFAZOLIN 2000 MG: 10 INJECTION, POWDER, FOR SOLUTION INTRAVENOUS at 19:47

## 2021-10-12 ASSESSMENT — PULMONARY FUNCTION TESTS
PIF_VALUE: 0
PIF_VALUE: 1
PIF_VALUE: 0

## 2021-10-12 ASSESSMENT — PAIN SCALES - GENERAL: PAINLEVEL_OUTOF10: 0

## 2021-10-12 NOTE — ANESTHESIA PRE PROCEDURE
Department of Anesthesiology  Preprocedure Note       Name:  Karen Burton   Age:  23 y.o.  :  1995                                          MRN:  [id number]         Date:  10/12/2021      Surgeon: Brigido Mcdaniels):  Ramon Corey MD    Procedure: Procedure(s):   SECTION    Medications prior to admission:   Prior to Admission medications    Medication Sig Start Date End Date Taking?  Authorizing Provider   Prenatal Vit-Fe Fumarate-FA (PRENATAL VITAMIN PO) Take 1 tablet by mouth   Yes Historical Provider, MD       Current medications:    Current Facility-Administered Medications   Medication Dose Route Frequency Provider Last Rate Last Admin    lactated ringers infusion   IntraVENous Continuous Facundo Albarado MD        sodium chloride flush 0.9 % injection 10 mL  10 mL IntraVENous 2 times per day Ramon Corey MD        sodium chloride flush 0.9 % injection 10 mL  10 mL IntraVENous PRN Facundo Albarado MD        0.9 % sodium chloride infusion  25 mL IntraVENous PRN Ramon Corey MD        citric acid-sodium citrate (BICITRA) solution 30 mL  30 mL Oral Once Ramon Corey MD        acetaminophen (TYLENOL) tablet 1,000 mg  1,000 mg Oral Once Ramon Corey MD        famotidine (PEPCID) injection 20 mg  20 mg IntraVENous Once Ramon Coery MD        metoclopramide (REGLAN) injection 10 mg  10 mg IntraVENous Once Ramon Corey MD        topical skin adhesive stick   Topical Once Ramon Corey MD        famotidine (PEPCID) injection 20 mg  20 mg IntraVENous Once Ramon Corey MD        metoclopramide (REGLAN) injection 10 mg  10 mg IntraVENous Once Ramon Corey MD        acetaminophen (TYLENOL) tablet 975 mg  975 mg Oral Once Ramon Corey MD        ceFAZolin (ANCEF) 2000 mg in dextrose 5 % 50 mL IVPB  2,000 mg IntraVENous Once Ramon Corey MD        oxytocin (PITOCIN) 30 units in 500 mL infusion  87.3 christina-units/min IntraVENous Continuous PRN Lavelle Srinivasan MD        And    oxytocin (PITOCIN) 10 unit bolus from the bag  10 Units IntraVENous PRN Lavelle Srinivasan MD        ceFAZolin (ANCEF) 2000 mg in dextrose 5 % 50 mL IVPB  2,000 mg IntraVENous Once Lavelle Srinivasan MD           Allergies:  No Known Allergies    Problem List:    Patient Active Problem List   Diagnosis Code    Threatened labor O47.9       Past Medical History:  History reviewed. No pertinent past medical history. Past Surgical History:        Procedure Laterality Date     SECTION, LOW TRANSVERSE      x4       Social History:    Social History     Tobacco Use    Smoking status: Former Smoker     Packs/day: 0.25     Types: Cigarettes     Quit date: 2021     Years since quittin.5    Smokeless tobacco: Never Used   Substance Use Topics    Alcohol use: Not Currently                                Counseling given: Not Answered      Vital Signs (Current):   Vitals:    10/12/21 1704 10/12/21 1705 10/12/21 1717 10/12/21 1726   BP: 131/89 131/89 (!) 144/90 (!) 139/97   Pulse: 96 96 89 92   Resp: 18      Temp: 36.8 °C (98.3 °F)      TempSrc: Oral      Weight: 208 lb (94.3 kg)      Height: 5' 8\" (1.727 m)                                                 BP Readings from Last 3 Encounters:   10/12/21 (!) 139/97   10/12/21 134/81   10/08/21 136/84       NPO Status: Time of last liquid consumption: 1706                        Time of last solid consumption: 0300                        Date of last liquid consumption: 10/12/21                        Date of last solid food consumption: 10/12/21    BMI:   Wt Readings from Last 3 Encounters:   10/12/21 208 lb (94.3 kg)   10/12/21 208 lb (94.3 kg)   10/08/21 204 lb (92.5 kg)     Body mass index is 31.63 kg/m².     CBC:   Lab Results   Component Value Date    WBC 10.8 10/12/2021    RBC 3.89 10/12/2021    HGB 11.1 10/12/2021    HCT 33.2 10/12/2021    MCV 85.4 10/12/2021    RDW 15.6 10/12/2021  10/12/2021       CMP:   Lab Results   Component Value Date     10/08/2021    K 3.7 10/08/2021     10/08/2021    CO2 22 10/08/2021    BUN 4 10/08/2021    CREATININE <0.5 10/08/2021    GFRAA >60 10/08/2021    LABGLOM >60 10/08/2021    GLUCOSE 89 10/08/2021    CALCIUM 8.5 10/08/2021       POC Tests: No results for input(s): POCGLU, POCNA, POCK, POCCL, POCBUN, POCHEMO, POCHCT in the last 72 hours. Coags: No results found for: PROTIME, INR, APTT    HCG (If Applicable): No results found for: PREGTESTUR, PREGSERUM, HCG, HCGQUANT     ABGs: No results found for: PHART, PO2ART, ZYE9WMO, BRV1PMC, BEART, X5SJZLPO     Type & Screen (If Applicable):  No results found for: LABABO, LABRH    Drug/Infectious Status (If Applicable):  No results found for: HIV, HEPCAB    COVID-19 Screening (If Applicable):   Lab Results   Component Value Date    COVID19 Not Detected 10/08/2021    COVID19 Not Detected 10/08/2021           Anesthesia Evaluation  Patient summary reviewed and Nursing notes reviewed no history of anesthetic complications:   Airway: Mallampati: I  TM distance: >3 FB   Neck ROM: full  Mouth opening: > = 3 FB Dental: normal exam         Pulmonary:Negative Pulmonary ROS and normal exam                               Cardiovascular:Negative CV ROS                      Neuro/Psych:   Negative Neuro/Psych ROS              GI/Hepatic/Renal:   (+) GERD:,           Endo/Other: Negative Endo/Other ROS                    Abdominal:             Vascular: negative vascular ROS. Other Findings:             Anesthesia Plan      regional and spinal     ASA 2 - emergent     (Patient request epidural for labor and delivery. Discussed procedure and risks including but not limited to changes in VSS, allergic reaction, infection, intravascular injection, paresthesia, n/v, severe headache, temporary or permanent rare neurologic sequelae and failed block. All questions answered.   Patient agreed to proceed)        Anesthetic plan and risks discussed with patient.                       MELBA Iqbal - EDILIA   10/12/2021

## 2021-10-12 NOTE — PROGRESS NOTES
ACOG with counseling, nursing notes and Dr Luis Miguel Alcantar notes reviewed, heard VM about CS scheduled for 10/6/21 in [place]. [address] WNL, wants CS asap. States has had 3 USDs in [place] so far, KADEEM 10/10/21 which makes her postdates. I attempted calling the hospital  [phone] but was on hold for a while. Will send to [facility] triage for NST and further evaluation. I have revieweed paperwork from [address] [place] [address] with CS date of 10/6/21.  USD here with KADEEM 10/26/21

## 2021-10-12 NOTE — PROGRESS NOTES
Initial prenatal visit. Patient arrived from [place] 1 week ago. Came to [place] for an ill grandfather who has since passed away. Patient seen in triage on 10/8/21 for contractions. Patient states she received care in [place] but records were not able to be obtained. Prenatal labs sent and ultrasound done. U/S report and labs on chart. Report + fetal movement, denies bleeding, leaking of fluid. C/o general discomfort and occasionally contractions with low dull back pain. Kick count information sheet given. All questions answered. Verbalized understanding. Reports childhood varicella vaccine, denies cats in home, discussed cat precautions, no recent travel, consents to blood transfusion if needed. Discussed AFP,CF screening; Pt Declined testing. Patient denies any falls within the past 30 days.

## 2021-10-12 NOTE — H&P
Department of Obstetrics and Gynecology   Obstetrics History and Physical        CHIEF COMPLAINT:  Postdates per notes from [place], elevated blood pressure    HISTORY OF PRESENT ILLNESS:      The patient is a 23 y.o. female at 41w4d. OB History        6    Para   4    Term   4            AB   1    Living   4       SAB   1    TAB        Ectopic        Molar        Multiple        Live Births   4            Patient presents with a chief complaint as above and is being admitted for E/M of term pregnancy, elevated blood pressure in OB triage. Denies any PIH related c/o, FMF  Best KADEEM 10/10/21 per 3 USDs in [place], was scheduled for CS 10/6/21 there but had to travel to [place] for an emergency. USD here few days ago with term SIUP      Estimated Due Date: Estimated Date of Delivery: 10/10/21    PRENATAL CARE:    Complicated by: scant PNC in [place], arrival to [place] late last week for family emergency (plans to move here permanently), 4 prior CS, obesity    PAST OB HISTORY:  OB History        6    Para   4    Term   4            AB   1    Living   4       SAB   1    TAB        Ectopic        Molar        Multiple        Live Births   4            4 prior CS    Past Medical History:    History reviewed. No pertinent past medical history. Past Surgical History:        Procedure Laterality Date     SECTION, LOW TRANSVERSE      x4     Allergies:  Patient has no known allergies.     Social History:    Social History     Socioeconomic History    Marital status: Single     Spouse name: Not on file    Number of children: Not on file    Years of education: Not on file    Highest education level: Not on file   Occupational History    Not on file   Tobacco Use    Smoking status: Former Smoker     Packs/day: 0.25     Types: Cigarettes     Quit date: 2021     Years since quittin.5    Smokeless tobacco: Never Used   Vaping Use    Vaping Use: Never used   Substance and Sexual Activity    Alcohol use: Not Currently    Drug use: Yes     Types: Marijuana     Comment: occasionally    Sexual activity: Yes     Partners: Male   Other Topics Concern    Not on file   Social History Narrative    Not on file     Social Determinants of Health     Financial Resource Strain:     Difficulty of Paying Living Expenses:    Food Insecurity:     Worried About Running Out of Food in the Last Year:     [address] in the Last Year:    Transportation Needs:     Lack of Transportation (Medical):  Lack of Transportation (Non-Medical):    Physical Activity:     Days of Exercise per Week:     Minutes of Exercise per Session:    Stress:     Feeling of Stress :    Social Connections:     Frequency of Communication with Friends and Family:     Frequency of Social Gatherings with Friends and Family:     Attends Orthodoxy Services:     Active Member of Clubs or Organizations:     Attends Club or Organization Meetings:     Marital Status:    Intimate Partner Violence:     Fear of Current or Ex-Partner:     Emotionally Abused:     Physically Abused:     Sexually Abused:      Family History:       Problem Relation Age of Onset    Heart Disease Father     High Blood Pressure Father     High Cholesterol Father     Heart Attack Father     Cancer Maternal Grandmother      Medications Prior to Admission:  Medications Prior to Admission: Prenatal Vit-Fe Fumarate-FA (PRENATAL VITAMIN PO), Take 1 tablet by mouth    REVIEW OF SYSTEMS:    neg    PHYSICAL EXAM:  Vitals:    10/12/21 1704 10/12/21 1705 10/12/21 1717 10/12/21 1726   BP: 131/89 131/89 (!) 144/90 (!) 139/97   Pulse: 96 96 89 92   Resp: 18      Temp: 98.3 °F (36.8 °C)      TempSrc: Oral      Weight: 208 lb (94.3 kg)      Height: 5' 8\" (1.727 m)        General appearance:  awake, alert, cooperative, no apparent distress, and appears stated age  Neurologic:  Awake, alert, oriented to name, place and time.     Lungs:  No increased work of breathing, good air exchange  Abdomen:  Soft, non tender, gravid, consistent with her gestational age, EFW by Leopold's maneuver was 7 lbs   Fetal heart rate:  Reassuring.   Pelvis:  Adequate pelvis  Cervix: cl/th  Contraction frequency:    Membranes:  Intact    Labs: pending    ASSESSMENT AND PLAN:    Postdates pregnancy, 4 prior CS, GHTN: Admit, proceed with delivery tonight, proc and all r/b/a d/w her and consented  Fetus reassuring  GHTN: labs pending, will follow closely, no prior known hx except as G1

## 2021-10-13 LAB
HCT VFR BLD CALC: 27.9 % (ref 36–48)
HEMOGLOBIN: 9.2 G/DL (ref 12–16)
MCH RBC QN AUTO: 28.5 PG (ref 26–34)
MCHC RBC AUTO-ENTMCNC: 32.9 G/DL (ref 31–36)
MCV RBC AUTO: 86.6 FL (ref 80–100)
PDW BLD-RTO: 15.4 % (ref 12.4–15.4)
PLATELET # BLD: 138 K/UL (ref 135–450)
PMV BLD AUTO: 10.4 FL (ref 5–10.5)
RBC # BLD: 3.23 M/UL (ref 4–5.2)
TOTAL SYPHILLIS IGG/IGM: NORMAL
WBC # BLD: 15 K/UL (ref 4–11)

## 2021-10-13 PROCEDURE — 36415 COLL VENOUS BLD VENIPUNCTURE: CPT

## 2021-10-13 PROCEDURE — 1220000000 HC SEMI PRIVATE OB R&B

## 2021-10-13 PROCEDURE — 85027 COMPLETE CBC AUTOMATED: CPT

## 2021-10-13 PROCEDURE — 6360000002 HC RX W HCPCS: Performed by: OBSTETRICS & GYNECOLOGY

## 2021-10-13 PROCEDURE — 6370000000 HC RX 637 (ALT 250 FOR IP): Performed by: OBSTETRICS & GYNECOLOGY

## 2021-10-13 PROCEDURE — 2580000003 HC RX 258: Performed by: OBSTETRICS & GYNECOLOGY

## 2021-10-13 RX ADMIN — FAMOTIDINE 20 MG: 20 TABLET, FILM COATED ORAL at 17:03

## 2021-10-13 RX ADMIN — DOCUSATE SODIUM 100 MG: 100 CAPSULE ORAL at 21:20

## 2021-10-13 RX ADMIN — FERROUS SULFATE TAB 325 MG (65 MG ELEMENTAL FE) 325 MG: 325 (65 FE) TAB at 10:03

## 2021-10-13 RX ADMIN — POLYETHYLENE GLYCOL 3350 17 G: 17 POWDER, FOR SOLUTION ORAL at 10:03

## 2021-10-13 RX ADMIN — OXYCODONE 5 MG: 5 TABLET ORAL at 21:20

## 2021-10-13 RX ADMIN — Medication 10 ML: at 21:22

## 2021-10-13 RX ADMIN — ACETAMINOPHEN 1000 MG: 500 TABLET ORAL at 06:14

## 2021-10-13 RX ADMIN — OXYCODONE 5 MG: 5 TABLET ORAL at 11:57

## 2021-10-13 RX ADMIN — IBUPROFEN 800 MG: 800 TABLET, FILM COATED ORAL at 15:42

## 2021-10-13 RX ADMIN — OXYCODONE 5 MG: 5 TABLET ORAL at 15:42

## 2021-10-13 RX ADMIN — FERROUS SULFATE TAB 325 MG (65 MG ELEMENTAL FE) 325 MG: 325 (65 FE) TAB at 21:46

## 2021-10-13 RX ADMIN — DOCUSATE SODIUM 100 MG: 100 CAPSULE ORAL at 10:03

## 2021-10-13 RX ADMIN — Medication 10 ML: at 15:43

## 2021-10-13 RX ADMIN — SODIUM CHLORIDE, POTASSIUM CHLORIDE, SODIUM LACTATE AND CALCIUM CHLORIDE: 600; 310; 30; 20 INJECTION, SOLUTION INTRAVENOUS at 00:35

## 2021-10-13 RX ADMIN — ACETAMINOPHEN 1000 MG: 500 TABLET ORAL at 21:20

## 2021-10-13 RX ADMIN — KETOROLAC TROMETHAMINE 30 MG: 30 INJECTION, SOLUTION INTRAMUSCULAR; INTRAVENOUS at 06:13

## 2021-10-13 ASSESSMENT — PAIN SCALES - GENERAL
PAINLEVEL_OUTOF10: 7
PAINLEVEL_OUTOF10: 8
PAINLEVEL_OUTOF10: 7
PAINLEVEL_OUTOF10: 7

## 2021-10-13 NOTE — ANESTHESIA POSTPROCEDURE EVALUATION
Department of Anesthesiology  Postprocedure Note    Patient: Consuelo Lucas  MRN: [id number]  YOB: 1995  Date of evaluation: 10/13/2021  Time:  12:51 PM     Procedure Summary     Date: 10/12/21 Room / Location: [facility] OR [address] [place] [address]    Anesthesia Start:  Anesthesia Stop:     Procedure:  SECTION (N/A Abdomen) Diagnosis: (repeat)    Surgeons: Satnam Florence MD Responsible Provider: Rashmi Nova MD    Anesthesia Type: regional, spinal ASA Status: 2 - Emergent          Anesthesia Type: regional, spinal    Nicci Phase I: Nicci Score: 9    Nicci Phase II: Nicci Score: 9    Last vitals: Reviewed and per EMR flowsheets. Anesthesia Post Evaluation    Patient location during evaluation: bedside  Patient participation: complete - patient participated  Level of consciousness: awake and alert  Pain score: 1  Nausea & Vomiting: no nausea  Complications: no  Cardiovascular status: blood pressure returned to baseline and hemodynamically stable  Respiratory status: acceptable  Hydration status: euvolemic  Comments: No complications, VSS.    Multimodal analgesia pain management approach

## 2021-10-13 NOTE — ANESTHESIA PROCEDURE NOTES
Spinal Block    Patient location during procedure: OB  Start time: 10/12/2021 7:59 PM  Reason for block: primary anesthetic  Staffing  Performed: resident/CRNA   Anesthesiologist: Tami Day MD  Resident/CRNA: Liston Hodgkin, APRN - CRNA  Preanesthetic Checklist  Completed: patient identified, IV checked, site marked, risks and benefits discussed, surgical consent, monitors and equipment checked, pre-op evaluation, timeout performed, anesthesia consent given, oxygen available and patient being monitored  Spinal Block  Patient position: sitting  Prep: ChloraPrep and x2  Patient monitoring: cardiac monitor, frequent blood pressure checks and continuous pulse ox  Approach: midline  Provider prep: mask and sterile gloves  Local infiltration: lidocaine  Agent: bupivacaine  Dose: 1.8  Dose: 1.8  Needle  Needle type: Pencan   Needle gauge: 25 G  Needle length: 3.5 in  Assessment  Sensory level: T4  Swirl obtained: Yes  CSF: clear  Attempts: 1  Hemodynamics: stable
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
## 2021-10-13 NOTE — OP NOTE
Operative Note      Patient: Adam Beverage  YOB: 1995  MRN: [id number]    Date of Procedure: 10/12/2021    Pre-operative Diagnosis:  Prior 4 CS, postdates, GHTN, obesity, scant PN care    Post-operative Diagnosis:  Same, adhesive disease, very thin ISELA    Procedure:  repeat low transverse  section, LUIS    Surgeon:  Dr Nabil Howard    Anesthesia:  Spinal anesthesia    Tubes, uterus, ovaries:  normal    Estimated blood loss:  250ml    Specimens:  none    Cord Gases: yes    Drains: no    Complications:  none    Information for the patient's :  Mariah Combs [[id number]]          Condition:    Infant stable, transfer to General Care Nursery  Mother stable, transfer to post anesthesia recovery    OPERATIVE INDICATIONS:  The patient is a 23 y.o., White female  [id number] at 40w2d who is admitted for repeat. She was adequately counseled regarding all risks, benefits and alternatives and consented for the procedure. OPERATIVE FINDINGS:   Information for the patient's :  Mariah Combs [[id number]]      . Delivered via low transverse  section. Amniotic fluid was clear. Placenta was  healthy. Uterus, tubes and ovaries were normal.      OPERATIVE DETAILS:  The patient was taken to the operating room and given spinal.  She was placed in the supine position with 15 degree Left lateral tilt. The surgical site was prepped and draped and a Edwards catheter was placed. A Pfannenstiel  incision was made through a previous incision and opening was done in a routine fashion. After opening the peritoneal cavity, multiple adhesions were noted between uterus and omentum, bladder, these were lysed serially. The lower uterine segment was inspected and found to be adequate. A bladder flap was seen as a distinct layer, bladder was dissected down using Metzenbaum scissors and blunt dissection.   At this point a transverse incision was made in the lower uterine segment and extended adequately on either side. The fetus was delivered from its Cephalic presentation without any difficulty. Cord was clamped and cut and the baby was handed over to the nursing staff. The placenta with its membranes was now removed. The uterus was now cleared of all membranes and blood clots. The uterine incision was closed in 2 layers first with 0 Vicryl continuous locking and second with an imbricating layer. The visceral peritoneum was now closed with 2-0 Vicryl. The parietal peritoneum was closed with 2-0 Vicryl. Hemostasis was confirmed at each level of the procedure. The rectus muscle was now closed with interrupted figure-of-eight sutures with 2-0 Vicryl. The rectus fascia was closed with 0 Vicryl continuous locking with 2 sutures beginning at either end of the incision and meeting in the midline. At this point hemostasis was again confirmed. The subcutaneous tissue was closed with 3-0 Vicryl and skin with Sutures/staples:12356}. A dry dressing was applied to the wound. The uterus was massaged and the vagina was emptied of all blood clots. At this point all instrument, needle and sponge counts were correct  x3.      Corrinne Glade, MD            Electronically signed by Corrinne Glade, MD on 10/12/2021 at 9:14 PM

## 2021-10-13 NOTE — PROCEDURES
FETAL SURVEILLANCE TESTING SUMMARY  From 10/9/21    INDICATIONS:  Fetal wellbeing    OBJECTIVE RESULTS:  Fetal heart variability: moderate  Fetal Heart Rate decelerations: none  Fetal Heart Rate accelerations: yes  Baseline FHR: 130 per minute  Fetal Non-stress Test: reactive    Fetal surveillance: reassuring

## 2021-10-13 NOTE — FLOWSHEET NOTE
Discharge prescriptions for ibuprofen and oxycodone were given to pt with instructions on use and side effects. See AVS.  Pt verbalized understanding of medications.

## 2021-10-13 NOTE — PROGRESS NOTES
[facility] post-partum note    Post-partum Day #1    Subjective:    Doing well, tolerating PO, flatus passed, voiding, ambulating, tolerating PO, appropriate pain management  Lochia: Normal    Objective:  Vitals:    10/13/21 0300 10/13/21 0415 10/13/21 0545 10/13/21 0628   BP:   112/65    Pulse:   75    Resp:   18    Temp:   98.4 °F (36.9 °C)    TempSrc:   Oral    SpO2: 96% 99% 97% 99%   Weight:       Height:           Physical Examination:  Appears well  Uterus: Firm, NT, BS+, I=CDI  Calves: NT    Labs:    Recent Labs     10/12/21  1815 10/13/21  0638   WBC 10.8 15.0*   HGB 11.1* 9.2*   HCT 33.2* 27.9*    138     Recent Labs     10/12/21  1815   *   K 3.9      CO2 20*   BUN 4*   CREATININE <0.5*   CALCIUM 9.4   AST 14*   ALT 11         Current Facility-Administered Medications:     lactated ringers infusion, , IntraVENous, Continuous, Paul CARBAJAL MD, Stopped at 10/13/21 0540    sodium chloride flush 0.9 % injection 10 mL, 10 mL, IntraVENous, 2 times per day, Frank Bruno MD    sodium chloride flush 0.9 % injection 10 mL, 10 mL, IntraVENous, PRN, Paul CARBAJAL MD    0.9 % sodium chloride infusion, 25 mL, IntraVENous, PRN, Frank Bruno MD    carboprost (HEMABATE) injection 250 mcg, 250 mcg, IntraMUSCular, PRN, Frank Bruno MD    miSOPROStol (CYTOTEC) tablet 800 mcg, 800 mcg, Rectal, PRN, Frank Bruno MD    famotidine (PEPCID) tablet 20 mg, 20 mg, Oral, BID PRN, Frank Bruno MD    famotidine (PEPCID) injection 20 mg, 20 mg, IntraVENous, BID PRN, Frank Bruno MD    rho(D) immune globulin (HYPERRHO S/D) injection 300 mcg, 300 mcg, IntraMUSCular, Once, Facundo Geller MD    ketorolac (TORADOL) injection 30 mg, 30 mg, IntraVENous, Q8H, Facundo CARBAJAL MD, 30 mg at 10/13/21 5191    ibuprofen (ADVIL;MOTRIN) tablet 800 mg, 800 mg, Oral, Q8H, Facundo CARBAJAL MD    acetaminophen (TYLENOL) tablet 1,000 mg, 1,000 mg, Oral, 3 times per day, Erik Berger MD, 1,000 mg at 10/13/21 0773    oxyCODONE (ROXICODONE) immediate release tablet 5 mg, 5 mg, Oral, Q4H PRN **OR** oxyCODONE (ROXICODONE) immediate release tablet 10 mg, 10 mg, Oral, Q4H PRN, Erik Berger MD    ondansetron (ZOFRAN) injection 4 mg, 4 mg, IntraVENous, Q6H PRN, Erik Berger MD    ondansetron (ZOFRAN-ODT) disintegrating tablet 8 mg, 8 mg, Oral, Q8H PRN, Erik Berger MD    diphenhydrAMINE (BENADRYL) injection 25 mg, 25 mg, IntraVENous, Q6H PRN, Erik Berger MD    simethicone (MYLICON) chewable tablet 80 mg, 80 mg, Oral, Q6H PRN, Erik Berger MD    docusate sodium (COLACE) capsule 100 mg, 100 mg, Oral, BID, Maria T CARBAJAL MD, 100 mg at 10/13/21 1003    polyethylene glycol (GLYCOLAX) packet 17 g, 17 g, Oral, Daily, Facundo CARBAJAL MD, 17 g at 10/13/21 1003    magnesium hydroxide (MILK OF MAGNESIA) 400 MG/5ML suspension 30 mL, 30 mL, Oral, Daily PRN, Erik Berger MD    bisacodyl (DULCOLAX) suppository 10 mg, 10 mg, Rectal, Daily PRN, Erik Berger MD    sennosides-docusate sodium (SENOKOT-S) 8.6-50 MG tablet 1 tablet, 1 tablet, Oral, Daily PRN, Erik Berger MD    lansinoh lanolin ointment, , Topical, Q1H PRN, Erik Berger MD    ferrous sulfate (IRON 325) tablet 325 mg, 325 mg, Oral, BID WC, Facundo CARBAJAL MD, 325 mg at 10/13/21 1003    measles, mumps & rubella vaccine (MMR) injection 0.5 mL, 0.5 mL, SubCUTAneous, Prior to discharge, Erik Berger MD    Tetanus-Diphth-Acell Pertussis (BOOSTRIX) injection 0.5 mL, 0.5 mL, IntraMUSCular, Prior to discharge, Erik Berger MD     Assessment/Plan:      Post Partum/PO: Continue Postpartum care  Mild PO anemia: appropriate, continue PO fe

## 2021-10-13 NOTE — DISCHARGE SUMMARY
Obstetrical Discharge Form    Gestational Age: 41w4d    Antepartum complications:  Prior 4 CS, postdates, GHTN, obesity, scant PN care    Date of Delivery: 10/12/21      Type of Delivery: RLTCS    Delivered By: Lisa Greenberg     Baby:      Information for the patient's :  Merlinda Simons [[id number]]   APGAR One: 8     Information for the patient's :  Merlinda Simons [[id number]]   APGAR Five: 9     Information for the patient's :  Merlinchase Pattersons [[id number]]   Birth Weight: 6 lb 15.8 oz (3.17 kg)       Anesthesia: Epidural    Intrapartum complications: None    Postpartum complications: anemia    Discharge Medication:    [facility] Home Medication Instructions LCR:[id number]    Printed on:10/15/21 1507   Medication Information                      ibuprofen (ADVIL;MOTRIN) 800 MG tablet  Take 1 tablet by mouth every 6 hours as needed for Pain             oxyCODONE (ROXICODONE) 5 MG immediate release tablet  Take 1 tablet by mouth every 6 hours as needed for Pain for up to 7 days. Intended supply: 7 days. Take lowest dose possible to manage pain             Prenatal Vit-Fe Fumarate-FA (PRENATAL VITAMIN PO)  Take 1 tablet by mouth                  Discharge Condition:  good    Discharge Date: 10/15/21    PLAN:  Follow up in 6 weeks for routine PP visit  All questions answered  D/C summary begun at delivery for D/C planning purposes, any delay in discharge from ordered D/C date due to  factors.

## 2021-10-13 NOTE — PLAN OF CARE
Problem: Anxiety:  Goal: Level of anxiety will decrease  Description: Level of anxiety will decrease  Outcome: Completed     Problem: Aspiration - Risk of:  Goal: Absence of aspiration  Description: Absence of aspiration  Outcome: Completed     Problem: Tissue Perfusion - Uteroplacental, Altered:  Goal: Absence of abnormal fetal heart rate pattern  Description: Absence of abnormal fetal heart rate pattern  Outcome: Completed     Problem: Venous Thromboembolism - Risk of:  Goal: Will show no signs or symptoms of venous thromboembolism  Description: Will show no signs or symptoms of venous thromboembolism  Outcome: Completed

## 2021-10-14 PROCEDURE — 1220000000 HC SEMI PRIVATE OB R&B

## 2021-10-14 PROCEDURE — 6370000000 HC RX 637 (ALT 250 FOR IP): Performed by: OBSTETRICS & GYNECOLOGY

## 2021-10-14 RX ADMIN — ACETAMINOPHEN 1000 MG: 500 TABLET ORAL at 22:10

## 2021-10-14 RX ADMIN — OXYCODONE 5 MG: 5 TABLET ORAL at 19:26

## 2021-10-14 RX ADMIN — DOCUSATE SODIUM 100 MG: 100 CAPSULE ORAL at 08:45

## 2021-10-14 RX ADMIN — ACETAMINOPHEN 1000 MG: 500 TABLET ORAL at 06:06

## 2021-10-14 RX ADMIN — FERROUS SULFATE TAB 325 MG (65 MG ELEMENTAL FE) 325 MG: 325 (65 FE) TAB at 08:45

## 2021-10-14 RX ADMIN — FERROUS SULFATE TAB 325 MG (65 MG ELEMENTAL FE) 325 MG: 325 (65 FE) TAB at 16:44

## 2021-10-14 RX ADMIN — SIMETHICONE 80 MG: 80 TABLET, CHEWABLE ORAL at 06:13

## 2021-10-14 RX ADMIN — DOCUSATE SODIUM 100 MG: 100 CAPSULE ORAL at 22:10

## 2021-10-14 RX ADMIN — IBUPROFEN 800 MG: 800 TABLET, FILM COATED ORAL at 08:45

## 2021-10-14 RX ADMIN — ACETAMINOPHEN 1000 MG: 500 TABLET ORAL at 14:38

## 2021-10-14 RX ADMIN — OXYCODONE 5 MG: 5 TABLET ORAL at 01:35

## 2021-10-14 RX ADMIN — OXYCODONE 5 MG: 5 TABLET ORAL at 14:38

## 2021-10-14 RX ADMIN — OXYCODONE 5 MG: 5 TABLET ORAL at 06:06

## 2021-10-14 RX ADMIN — IBUPROFEN 800 MG: 800 TABLET, FILM COATED ORAL at 01:35

## 2021-10-14 RX ADMIN — IBUPROFEN 800 MG: 800 TABLET, FILM COATED ORAL at 16:44

## 2021-10-14 RX ADMIN — OXYCODONE 10 MG: 5 TABLET ORAL at 23:29

## 2021-10-14 ASSESSMENT — PAIN SCALES - GENERAL
PAINLEVEL_OUTOF10: 10
PAINLEVEL_OUTOF10: 10
PAINLEVEL_OUTOF10: 6
PAINLEVEL_OUTOF10: 7
PAINLEVEL_OUTOF10: 5
PAINLEVEL_OUTOF10: 10
PAINLEVEL_OUTOF10: 6
PAINLEVEL_OUTOF10: 5

## 2021-10-14 NOTE — PROGRESS NOTES
[facility] post-partum note    Post-partum Day #2    Subjective:    No complaints  Lochia: Normal    Objective:  Vitals:    10/13/21 1345 10/13/21 1745 10/13/21 2200 10/14/21 0600   BP: 125/80 132/82 126/81 117/76   Pulse: 76 85 66 72   Resp: 18 18 18 16   Temp: 97.9 °F (36.6 °C) 97.5 °F (36.4 °C) 97.6 °F (36.4 °C) 98.3 °F (36.8 °C)   TempSrc: Oral Oral Oral Oral   SpO2:   99%    Weight:       Height:           Physical Examination:  Appears well  Uterus: Firm, NT  Calves: NT    Labs:    Recent Labs     10/12/21  1815 10/13/21  0638   WBC 10.8 15.0*   HGB 11.1* 9.2*   HCT 33.2* 27.9*    138     Recent Labs     10/12/21  1815   *   K 3.9      CO2 20*   BUN 4*   CREATININE <0.5*   CALCIUM 9.4   AST 14*   ALT 11         Current Facility-Administered Medications:     lactated ringers infusion, , IntraVENous, Continuous, Paul CARBAJAL MD, Stopped at 10/13/21 0540    sodium chloride flush 0.9 % injection 10 mL, 10 mL, IntraVENous, 2 times per day, Paul CARBAJAL MD, 10 mL at 10/13/21 2122    sodium chloride flush 0.9 % injection 10 mL, 10 mL, IntraVENous, PRN, Paul CARBAJAL MD    0.9 % sodium chloride infusion, 25 mL, IntraVENous, PRN, Frank Bruno MD    carboprost (HEMABATE) injection 250 mcg, 250 mcg, IntraMUSCular, PRN, Frank Bruno MD    miSOPROStol (CYTOTEC) tablet 800 mcg, 800 mcg, Rectal, PRN, Frank Bruno MD    famotidine (PEPCID) tablet 20 mg, 20 mg, Oral, BID PRN, Paul CARBAJAL MD, 20 mg at 10/13/21 1703    famotidine (PEPCID) injection 20 mg, 20 mg, IntraVENous, BID PRN, Frank Bruno MD    rho(D) immune globulin (HYPERRHO S/D) injection 300 mcg, 300 mcg, IntraMUSCular, Once, Facundo Geller MD    ketorolac (TORADOL) injection 30 mg, 30 mg, IntraVENous, Q8H, Facundo CARBAJAL MD, 30 mg at 10/13/21 0613    ibuprofen (ADVIL;MOTRIN) tablet 800 mg, 800 mg, Oral, Q8H, Facundo CARBAJAL MD, 800 mg at 10/14/21 0845   acetaminophen (TYLENOL) tablet 1,000 mg, 1,000 mg, Oral, 3 times per day, Kenia Downing MD, 1,000 mg at 10/14/21 0606    oxyCODONE (ROXICODONE) immediate release tablet 5 mg, 5 mg, Oral, Q4H PRN, 5 mg at 10/14/21 0606 **OR** oxyCODONE (ROXICODONE) immediate release tablet 10 mg, 10 mg, Oral, Q4H PRN, Kenia Downing MD    ondansetron (ZOFRAN) injection 4 mg, 4 mg, IntraVENous, Q6H PRN, Kenia Downing MD    ondansetron (ZOFRAN-ODT) disintegrating tablet 8 mg, 8 mg, Oral, Q8H PRN, Kenia Downing MD    diphenhydrAMINE (BENADRYL) injection 25 mg, 25 mg, IntraVENous, Q6H PRN, Kenia Downing MD    simethicone (MYLICON) chewable tablet 80 mg, 80 mg, Oral, Q6H PRN, Erasmo CARBAJAL MD, 80 mg at 10/14/21 6305    docusate sodium (COLACE) capsule 100 mg, 100 mg, Oral, BID, Erasmo CARBAJAL MD, 100 mg at 10/14/21 0845    polyethylene glycol (GLYCOLAX) packet 17 g, 17 g, Oral, Daily, Facundo CARBAJAL MD, 17 g at 10/13/21 1003    magnesium hydroxide (MILK OF MAGNESIA) 400 MG/5ML suspension 30 mL, 30 mL, Oral, Daily PRN, Kenia Downing MD    bisacodyl (DULCOLAX) suppository 10 mg, 10 mg, Rectal, Daily PRN, Kenia Downing MD    sennosides-docusate sodium (SENOKOT-S) 8.6-50 MG tablet 1 tablet, 1 tablet, Oral, Daily PRN, Kenia Downing MD    lansinoh lanolin ointment, , Topical, Q1H PRN, Kenia Downing MD    ferrous sulfate (IRON 325) tablet 325 mg, 325 mg, Oral, BID WC, Facundo CARBAJAL MD, 325 mg at 10/14/21 0845    measles, mumps & rubella vaccine (MMR) injection 0.5 mL, 0.5 mL, SubCUTAneous, Prior to discharge, Kenia Downing MD    Tetanus-Diphth-Acell Pertussis (BOOSTRIX) injection 0.5 mL, 0.5 mL, IntraMUSCular, Prior to discharge, Kenia Downing MD     Assessment/Plan:      Post Partum: advance Postpartum care  Discharge today : no

## 2021-10-14 NOTE — PROGRESS NOTES
Social Service Note:  Spoke with Jaden Jerome CPS intake Reza Sargent. [employer] states a case was not open and to call back if infant u-cord results are positive. Pt clear to d/c infant from a social service point of view.     Dg Villalobos BSW, [place]

## 2021-10-15 VITALS
OXYGEN SATURATION: 98 % | WEIGHT: 208 LBS | DIASTOLIC BLOOD PRESSURE: 82 MMHG | HEIGHT: 68 IN | RESPIRATION RATE: 18 BRPM | BODY MASS INDEX: 31.52 KG/M2 | SYSTOLIC BLOOD PRESSURE: 126 MMHG | HEART RATE: 80 BPM | TEMPERATURE: 98.2 F

## 2021-10-15 PROCEDURE — 6370000000 HC RX 637 (ALT 250 FOR IP): Performed by: OBSTETRICS & GYNECOLOGY

## 2021-10-15 RX ORDER — BUTALBITAL, ACETAMINOPHEN AND CAFFEINE 50; 325; 40 MG/1; MG/1; MG/1
1 TABLET ORAL EVERY 6 HOURS PRN
Qty: 30 TABLET | Refills: 0 | Status: SHIPPED | OUTPATIENT
Start: 2021-10-15

## 2021-10-15 RX ORDER — BUTALBITAL, ACETAMINOPHEN AND CAFFEINE 50; 325; 40 MG/1; MG/1; MG/1
1 TABLET ORAL EVERY 4 HOURS PRN
Status: DISCONTINUED | OUTPATIENT
Start: 2021-10-15 | End: 2021-10-15 | Stop reason: HOSPADM

## 2021-10-15 RX ADMIN — POLYETHYLENE GLYCOL 3350 17 G: 17 POWDER, FOR SOLUTION ORAL at 09:40

## 2021-10-15 RX ADMIN — OXYCODONE 10 MG: 5 TABLET ORAL at 14:35

## 2021-10-15 RX ADMIN — ACETAMINOPHEN 1000 MG: 500 TABLET ORAL at 16:45

## 2021-10-15 RX ADMIN — ACETAMINOPHEN 1000 MG: 500 TABLET ORAL at 06:20

## 2021-10-15 RX ADMIN — OXYCODONE 10 MG: 5 TABLET ORAL at 09:37

## 2021-10-15 RX ADMIN — OXYCODONE 10 MG: 5 TABLET ORAL at 03:44

## 2021-10-15 RX ADMIN — IBUPROFEN 800 MG: 800 TABLET, FILM COATED ORAL at 01:05

## 2021-10-15 RX ADMIN — BUTALBITAL, ACETAMINOPHEN, AND CAFFEINE 1 TABLET: 50; 325; 40 TABLET ORAL at 11:02

## 2021-10-15 RX ADMIN — BUTALBITAL, ACETAMINOPHEN, AND CAFFEINE 1 TABLET: 50; 325; 40 TABLET ORAL at 15:13

## 2021-10-15 RX ADMIN — FERROUS SULFATE TAB 325 MG (65 MG ELEMENTAL FE) 325 MG: 325 (65 FE) TAB at 09:32

## 2021-10-15 RX ADMIN — IBUPROFEN 800 MG: 800 TABLET, FILM COATED ORAL at 09:33

## 2021-10-15 RX ADMIN — DOCUSATE SODIUM 100 MG: 100 CAPSULE ORAL at 09:32

## 2021-10-15 ASSESSMENT — PAIN SCALES - GENERAL
PAINLEVEL_OUTOF10: 7
PAINLEVEL_OUTOF10: 7
PAINLEVEL_OUTOF10: 6
PAINLEVEL_OUTOF10: 7
PAINLEVEL_OUTOF10: 7
PAINLEVEL_OUTOF10: 5
PAINLEVEL_OUTOF10: 6
PAINLEVEL_OUTOF10: 5
PAINLEVEL_OUTOF10: 5

## 2021-10-15 NOTE — PROGRESS NOTES
POSTPARTUM ROUNDS  [facility]    Post-partum Day #2 s/p RCS#5      Subjective:    Patient doing well this am. Reports headache and back pain, pin point lower back, sharp, spasm. Abd Pain controlled. Tolerating regular diet. Ambulating and voiding with no difficulty. No bowel or bladder difficulties. +Flatus  Lochia: Normal, no clots  Feeding: Bottle    Contraception questions answered. Objective:  Vitals:    10/14/21 0600 10/14/21 1400 10/14/21 2200 10/15/21 0600   BP: 117/76 129/78 136/85 135/81   Pulse: 72 80 81 74   Resp: 16 18 18 16   Temp: 98.3 °F (36.8 °C) 97 °F (36.1 °C) 97.7 °F (36.5 °C) 98.4 °F (36.9 °C)   TempSrc: Oral Oral Oral Oral   SpO2:   98% 98%   Weight:       Height:           Physical Examination:  Appears well  Incision: Clean, dry, intact, steri strips  Uterus: Firm, NT  Calves: NT    Lab Results   Component Value Date    WBC 15.0 (H) 10/13/2021    HGB 9.2 (L) 10/13/2021    HCT 27.9 (L) 10/13/2021    MCV 86.6 10/13/2021     10/13/2021        Assessment:  23 y.o. [id number] PPD # 2 s/p RCS, Anemia, Back Pain, Headache  Body mass index is 31.63 kg/m².   A POS    Plan:    Continue postpartum care  Anemia - iron  Headache/Back pain - seen by anes, trial Fioricet, if no improvement consider muscle relaxant    Erum Poole MD

## 2021-10-15 NOTE — FLOWSHEET NOTE
Postpartum and infant care teaching completed and forms signed by patient. Copy witnessed by RN and given to patient. Patient verbalized understanding of all teaching points. Patient has scripts for home use. Patient [facility] instructed. Patient verbalizes understanding of discharge instructions and denies further questions. ID bands checked. Mother's ID band and one of baby's ID bands removed and taped to footprint sheet, signed by patient and witnessed by RN. Patient discharged in stable condition accompanied by family/guardian. Discharged in wheelchair, holding baby in arms.

## 2021-10-15 NOTE — FLOWSHEET NOTE
Pt c/o headache in frontal lobe toward eyes since yesterday. Pt said she did get some relief with tylenol at some point. Denies vision change. RN notified CRNA to assess pt's headache.

## 2021-10-15 NOTE — PROGRESS NOTES
CLINICAL PHARMACY NOTE: MEDS TO BEDS    Total # of Prescriptions Filled: 3   The following medications were delivered to the patient:  · Ibuprofen 800 mg tabs  · Oxycodone 5 mg tabs  · Butalbital-APAP-Caffeine -40 tabs    Additional Documentation:    Patient signed and paid for 3 Rx bedside

## 2021-10-16 LAB
BLOOD BANK DISPENSE STATUS: NORMAL
BLOOD BANK DISPENSE STATUS: NORMAL
BLOOD BANK PRODUCT CODE: NORMAL
BLOOD BANK PRODUCT CODE: NORMAL
BPU ID: NORMAL
BPU ID: NORMAL
DESCRIPTION BLOOD BANK: NORMAL
DESCRIPTION BLOOD BANK: NORMAL

## 2021-11-23 ENCOUNTER — POSTPARTUM VISIT (OUTPATIENT)
Dept: OBGYN | Age: 26
End: 2021-11-23
Payer: MEDICAID

## 2021-11-23 VITALS
BODY MASS INDEX: 29.65 KG/M2 | DIASTOLIC BLOOD PRESSURE: 76 MMHG | WEIGHT: 195 LBS | HEART RATE: 87 BPM | SYSTOLIC BLOOD PRESSURE: 127 MMHG

## 2021-11-23 PROCEDURE — 88175 CYTOPATH C/V AUTO FLUID REDO: CPT

## 2021-11-23 NOTE — PROGRESS NOTES
Social Service Note:   Patient completed depression scale. Patient scored a 5  and is not showing signs of depression. Patient didn't have any other questions or needs at this time.     Andrea CALDERONW, [place]

## 2021-11-23 NOTE — PROGRESS NOTES
[facility]  Postpartum Visit  Patient Name:  Dangelo Max  YOB: 1995      HPI     The patient is a 23 y.o. female   OB History        6    Para   5    Term   5            AB   1    Living   5       SAB   1    IAB        Ectopic        Molar        Multiple   0    Live Births   5             who presents for her 6 weeks postpartum visit. Her pregnancy was complicated by late care GHTN, obesity  Problem list: {PROBLEM MLV:[id number]}  She has no unusual complaints and complains of migraine headaches. Delivery:     Procedure:  Repeat  section    Surgeon:       OB History    Para Term  AB Living   6 5 5 0 1 5   SAB IAB Ectopic Molar Multiple Live Births   1 0 0 0 0 5      # Outcome Date GA Lbr Gerardo/2nd Weight Sex Delivery Anes PTL Lv   6 Term 10/12/21 40w2d  6 lb 15.8 oz (3.17 kg) M CS-LTranv Spinal N MAUDE      Birth Comments: [facility]      Name: Meaghan Pride: 8  Apgar5: 9   5 Term 20    M CS-LTranv  N MAUDE   4 Term 19    M CS-LTranv   MAUDE   3 Term 19    F CS-LTranv  N MAUDE   2 SAB 17           1 Term /    F CS-LTranv   MAUDE       Anesthesia:  Spina;    Delivery complications:  None    She is currently bottle    Her vaginal bleeding is bright red and similar to period. She has had a period: Yes    She requested contraception. Last Pap: unknown    ROS     Gastrointestinal: {Findings; ROS gastrointestinal:58624::\"negative\"}  Genitourinary:{Findings; ROS genitourinary:92173::\"negative\"}  Behavioral/Psych: {Findings; ROS psychiatric:33035::\"negative\"}      EXAM     Vital Signs:  LMP 2021      General: Mood is {HX POSTPARTUM MOOD:435755}. Lungs:  CTA bilat    Heart:  RRR without murmur    Breast: No masses, no erythema    Abdomen: soft, nontender, no masses    Pelvic: Vulva WNL, vagina WNL, cervix WNL, uterus WNL, perineum WNL      ASSESSMENT     1. Routine Postpartum visit  2.  Contraception counseling      PLAN

## (undated) DEVICE — GARMENT,MEDLINE,DVT,INT,CALF,MED, GEN2: Brand: MEDLINE

## (undated) DEVICE — NON-ADHERENT PAD: Brand: TELFA

## (undated) DEVICE — Z CONVERTED USE 2275207 CLOTH PREP W7.5XL7.5IN 2% CHG SKIN ALC AND RNS FREE

## (undated) DEVICE — TRAY URIN CATH 16FR DRNGE BG STATLOK STBL DEV F SURSTP

## (undated) DEVICE — 50" SINGLE PATIENT USE HOVERMATT: Brand: SINGLE PATIENT USE HOVERMATT

## (undated) DEVICE — 3M™ STERI-STRIP™ REINFORCED ADHESIVE SKIN CLOSURES, R1549, 1/2 IN X 2 IN (12 MM X 50 MM), 6 STRIPS/ENVELOPE: Brand: 3M™ STERI-STRIP™

## (undated) DEVICE — SUTURE VCRL SZ 2-0 L36IN ABSRB UD L36MM CT-1 1/2 CIR J945H

## (undated) DEVICE — WOUND RETRACTOR AND PROTECTOR: Brand: ALEXIS WOUND PROTECTOR-RETRACTOR

## (undated) DEVICE — PACK PROCEDURE SURG C SECT REV 1

## (undated) DEVICE — 3M™ STERI-STRIP™ COMPOUND BENZOIN TINCTURE 40 BAGS/CARTON 4 CARTONS/CASE C1544: Brand: 3M™ STERI-STRIP™

## (undated) DEVICE — SUTURE COAT VCRL SZ 0 L36IN ABSRB VLT CTX L48MM TAPERPOINT J370H

## (undated) DEVICE — PENCIL SMK EVAC L10FT TBNG NONSTICK ESU BLDE PLUMEPEN ELITE

## (undated) DEVICE — APPLICATOR MEDICATED 26 CC SOLUTION HI LT ORNG CHLORAPREP

## (undated) DEVICE — 9165 UNIVERSAL PATIENT PLATE: Brand: 3M™

## (undated) DEVICE — BLADE CLIPPER GEN PURP NS

## (undated) DEVICE — BANDAGE COMPR W3INXL5YD COT ZN OXIDE TAN E ADH HVYWT

## (undated) DEVICE — GLOVE SURG SZ 65 THK91MIL LTX FREE SYN POLYISOPRENE